# Patient Record
Sex: MALE | Race: WHITE | NOT HISPANIC OR LATINO | Employment: PART TIME | URBAN - METROPOLITAN AREA
[De-identification: names, ages, dates, MRNs, and addresses within clinical notes are randomized per-mention and may not be internally consistent; named-entity substitution may affect disease eponyms.]

---

## 2023-01-19 ENCOUNTER — OFFICE VISIT (OUTPATIENT)
Dept: URGENT CARE | Facility: MEDICAL CENTER | Age: 18
End: 2023-01-19

## 2023-01-19 VITALS
HEART RATE: 91 BPM | OXYGEN SATURATION: 97 % | DIASTOLIC BLOOD PRESSURE: 68 MMHG | TEMPERATURE: 97.8 F | RESPIRATION RATE: 18 BRPM | SYSTOLIC BLOOD PRESSURE: 100 MMHG

## 2023-01-19 DIAGNOSIS — J45.901 EXACERBATION OF ASTHMA, UNSPECIFIED ASTHMA SEVERITY, UNSPECIFIED WHETHER PERSISTENT: Primary | ICD-10-CM

## 2023-01-19 RX ORDER — BUDESONIDE AND FORMOTEROL FUMARATE DIHYDRATE 160; 4.5 UG/1; UG/1
AEROSOL RESPIRATORY (INHALATION)
COMMUNITY
Start: 2022-12-23

## 2023-01-19 RX ORDER — PREDNISONE 20 MG/1
TABLET ORAL
Qty: 18 TABLET | Refills: 0 | Status: SHIPPED | OUTPATIENT
Start: 2023-01-19

## 2023-01-19 RX ORDER — CETIRIZINE HYDROCHLORIDE 10 MG/1
10 TABLET ORAL
COMMUNITY

## 2023-01-19 NOTE — PROGRESS NOTES
[facility] [employer] [facility]        NAME: Jr Cruz is a 21 y o  male  : 2005    MRN: [id number]  DATE: 2023  TIME: 11:26 AM    Assessment and Plan   Exacerbation of asthma, unspecified asthma severity, unspecified whether persistent [J45 901]  1  Exacerbation of asthma, unspecified asthma severity, unspecified whether persistent  predniSONE 20 mg tablet            Patient Instructions       Follow up with PCP in 3-5 days  Proceed to  ER if symptoms worsen  Chief Complaint     Chief Complaint   Patient presents with   • Cough     Patient c/o cough, chest congestion with tightness x 2-3 days           History of Present Illness       13-year-old male here today with complaint of cough and some chest congestion and tightness over the last 2 or 3 days  Denies fever  He has a known history of seasonal allergy currently using Zyrtec daily and Flonase nasal spray-1 spray in each nostril once daily  He also uses Symbicort inhaler 1 puff once a day but in the last 2 to 3 days she is uses Symbicort inhaler twice a day with no significant improvement  Cough seems to be predominant worse at night  Nonproductive  Denies wheezing  Denies any cold symptoms presently  Review of Systems   Review of Systems   Constitutional: Negative  HENT: Positive for congestion and postnasal drip  Respiratory: Positive for cough and shortness of breath  Neurological: Negative            Current Medications       Current Outpatient Medications:   •  predniSONE 20 mg tablet, 3 tablets once daily day 1 through 3 then 2 tablets daily day 4 through 6 then 1 tablet daily day 7 through 9 , Disp: 18 tablet, Rfl: 0  •  cetirizine (ZyrTEC) 10 mg tablet, Take 10 mg by mouth, Disp: , Rfl:   •  Multiple Vitamin (MULTIVITAMIN ADULT PO), Take 1 Dose by mouth, Disp: , Rfl:   •  Symbicort 160-4 5 MCG/ACT inhaler, , Disp: , Rfl:     Current Allergies     Allergies as of 2023   • (No Known Allergies)            The following portions of the patient's history were reviewed and updated as appropriate: allergies, current medications, past family history, past medical history, past social history, past surgical history and problem list      No past medical history on file  No past surgical history on file  No family history on file  Medications have been verified  Objective   /68   Pulse 91   Temp 97 8 °F (36 6 °C)   Resp 18   SpO2 97%   No LMP for male patient  Physical Exam     Physical Exam  Vitals and nursing note reviewed  Constitutional:       Appearance: Normal appearance  HENT:      Nose:      Comments: Erythematous hypertrophic left turbinate     Mouth/Throat:      Comments: Cobblestoning observed in posterior pharynx  Pulmonary:      Effort: Pulmonary effort is normal       Comments: Decreased breath sounds bilaterally  Musculoskeletal:      Cervical back: Normal range of motion and neck supple  Neurological:      Mental Status: He is alert

## 2023-01-19 NOTE — PATIENT INSTRUCTIONS
Patient vital signs are stable  I started the patient on prednisone tapering dose from 60 to 20 mg over 9 days  I encouraged patient to continue with Symbicort 160/50-2 puffs twice a day for 7 days  He is to resume his albuterol inhaler 2 puffs 2-3 times a day for 7 days then taper as needed  He is to increase frequency of Flonase to 2 squirts in each nostril once daily  Recommended he obtain over-the-counter Mucinex for expectoration  Increase fluid intake  Follow-up with PCP if symptoms persist or worsen  Asthma   WHAT YOU NEED TO KNOW:   Asthma is a lung disease that makes breathing difficult  Chronic inflammation and reactions to triggers narrow the airways in the lungs  Asthma can become life-threatening if it is not managed  DISCHARGE INSTRUCTIONS:   Call your local emergency number (911 in the [address] [facility]) if:   You have severe shortness of breath  The skin around your neck and ribs pulls in with each breath  Your peak flow numbers are in the red zone of your AAP  Return to the emergency department if:   You have shortness of breath, even after you take your short-term medicine as directed  Your lips or nails turn blue or gray  Call your doctor or asthma specialist if:   You run out of medicine before your next refill is due  Your symptoms get worse  You need to take more medicine than usual to control your symptoms  You have questions or concerns about your condition or care  Medicines:   Medicines  may be used to decrease inflammation, open airways, and make it easier to breathe  Medicines may be inhaled, taken as a pill, or injected  Short-term medicines relieve your symptoms quickly  Long-term medicines are used to prevent future asthma attacks  Other medicines may be needed if your regular medicines are not able to prevent attacks  You may also need medicine to help control your allergies   Ask your healthcare provider for more information about any medicine you are given and how to take it safely  Take your medicine as directed  Contact your healthcare provider if you think your medicine is not helping or if you have side effects  Tell him of her if you are allergic to any medicine  Keep a list of the medicines, vitamins, and herbs you take  Include the amounts, and when and why you take them  Bring the list or the pill bottles to follow-up visits  Carry your medicine list with you in case of an emergency  Manage your symptoms and prevent future attacks: Follow your Asthma Action Plan (AAP)  This is a written plan that you and your healthcare provider create  It explains which medicine you need and when to change doses if necessary  It also explains how you can monitor symptoms and use a peak flow meter  The meter measures how well your lungs are working  Manage other health conditions , such as allergies, acid reflux, and sleep apnea  Identify and avoid triggers  These may include pets, dust mites, mold, and cockroaches  Do not smoke or be around others who smoke  Nicotine and other chemicals in cigarettes and cigars can cause lung damage  Ask your healthcare provider for information if you currently smoke and need help to quit  E-cigarettes or smokeless tobacco still contain nicotine  Talk to your healthcare provider before you use these products  Ask about the flu vaccine  The flu can make your asthma worse  You may need a yearly flu shot  Follow up with your healthcare provider as directed: You will need to return to make sure your medicine is working and your symptoms are controlled  You may be referred to an asthma or allergy specialist  Juan R Spencer may be asked to keep a record of your peak flow values and bring it with you to your appointments  Write down your questions so you remember to ask them during your visits    © Copyright [employer] 2022 Information is for End User's use only and may not be sold, redistributed or otherwise used for commercial purposes  All illustrations and images included in CareNotes® are the copyrighted property of A D A M , Inc  or Anna Patel  The above information is an  only  It is not intended as medical advice for individual conditions or treatments  Talk to your doctor, nurse or pharmacist before following any medical regimen to see if it is safe and effective for you

## 2023-04-17 PROBLEM — J45.20 MILD INTERMITTENT ASTHMA WITHOUT COMPLICATION: Status: ACTIVE | Noted: 2023-04-17

## 2023-04-17 PROBLEM — E55.9 VITAMIN D DEFICIENCY: Status: ACTIVE | Noted: 2023-04-17

## 2023-04-19 ENCOUNTER — CLINICAL SUPPORT (OUTPATIENT)
Dept: FAMILY MEDICINE CLINIC | Facility: CLINIC | Age: 18
End: 2023-04-19

## 2023-04-19 DIAGNOSIS — Z11.1 ENCOUNTER FOR TUBERCULIN SKIN TEST: Primary | ICD-10-CM

## 2023-07-02 ENCOUNTER — OFFICE VISIT (OUTPATIENT)
Dept: URGENT CARE | Facility: MEDICAL CENTER | Age: 18
End: 2023-07-02
Payer: COMMERCIAL

## 2023-07-02 VITALS
RESPIRATION RATE: 20 BRPM | DIASTOLIC BLOOD PRESSURE: 63 MMHG | HEART RATE: 64 BPM | WEIGHT: 210 LBS | HEIGHT: 75 IN | BODY MASS INDEX: 26.11 KG/M2 | OXYGEN SATURATION: 97 % | TEMPERATURE: 99.2 F | SYSTOLIC BLOOD PRESSURE: 132 MMHG

## 2023-07-02 DIAGNOSIS — K08.89 TOOTHACHE: Primary | ICD-10-CM

## 2023-07-02 PROCEDURE — 99213 OFFICE O/P EST LOW 20 MIN: CPT | Performed by: PHYSICIAN ASSISTANT

## 2023-07-02 RX ORDER — FLUTICASONE PROPIONATE 50 MCG
1 SPRAY, SUSPENSION (ML) NASAL DAILY
COMMUNITY

## 2023-07-02 RX ORDER — IBUPROFEN 600 MG/1
600 TABLET ORAL EVERY 6 HOURS PRN
Qty: 30 TABLET | Refills: 0 | Status: SHIPPED | OUTPATIENT
Start: 2023-07-02

## 2023-07-02 RX ORDER — AMOXICILLIN 500 MG/1
500 CAPSULE ORAL EVERY 8 HOURS SCHEDULED
Qty: 30 CAPSULE | Refills: 0 | Status: SHIPPED | OUTPATIENT
Start: 2023-07-02 | End: 2023-07-12

## 2023-07-02 NOTE — PROGRESS NOTES
[facility]        NAME: Jayda Lewis is a 19 y.o. male  : 2005    MRN: [id number]  DATE: 2023  TIME: 9:53 AM    /63   Pulse 64   Temp 99.2 °F (37.3 °C)   Resp 20   Ht 6' 3" (1.905 m)   Wt 95.3 kg (210 lb)   SpO2 97%   BMI 26.25 kg/m²     Assessment and Plan   Toothache [K08.89]  1. Toothache  ibuprofen (MOTRIN) 600 mg tablet    amoxicillin (AMOXIL) 500 mg capsule            Patient Instructions       Follow up with PCP in 3-5 days. Proceed to  ER if symptoms worsen. Chief Complaint     Chief Complaint   Patient presents with   • Dental Pain     Patient presents with pain in his mouth along his gum line on R lower gum line for 4 days         History of Present Illness       Pt with right lower tooth pain x 4 days       Review of Systems   Review of Systems   Constitutional: Negative. HENT: Positive for dental problem. Eyes: Negative. Respiratory: Negative. Cardiovascular: Negative. Gastrointestinal: Negative. Endocrine: Negative. Genitourinary: Negative. Musculoskeletal: Negative. Skin: Negative. Allergic/Immunologic: Negative. Neurological: Negative. Hematological: Negative. Psychiatric/Behavioral: Negative. All other systems reviewed and are negative.         Current Medications       Current Outpatient Medications:   •  amoxicillin (AMOXIL) 500 mg capsule, Take 1 capsule (500 mg total) by mouth every 8 (eight) hours for 10 days, Disp: 30 capsule, Rfl: 0  •  fluticasone (FLONASE) 50 mcg/act nasal spray, 1 spray into each nostril daily, Disp: , Rfl:   •  ibuprofen (MOTRIN) 600 mg tablet, Take 1 tablet (600 mg total) by mouth every 6 (six) hours as needed for mild pain, Disp: 30 tablet, Rfl: 0  •  Symbicort 160-4.5 MCG/ACT inhaler, , Disp: , Rfl:   •  cetirizine (ZyrTEC) 10 mg tablet, Take 10 mg by mouth (Patient not taking: Reported on 2023), Disp: , Rfl:   •  Multiple Vitamin (MULTIVITAMIN ADULT PO), Take 1 Dose by mouth (Patient not taking: Reported on 7/2/2023), Disp: , Rfl:   •  predniSONE 20 mg tablet, 3 tablets once daily day 1 through 3 then 2 tablets daily day 4 through 6 then 1 tablet daily day 7 through 9. (Patient not taking: Reported on 4/17/2023), Disp: 18 tablet, Rfl: 0    Current Allergies     Allergies as of 07/02/2023   • (No Known Allergies)            The following portions of the patient's history were reviewed and updated as appropriate: allergies, current medications, past family history, past medical history, past social history, past surgical history and problem list.     History reviewed. No pertinent past medical history. Past Surgical History:   Procedure Laterality Date   • SHOULDER SURGERY         History reviewed. No pertinent family history. Medications have been verified. Objective   /63   Pulse 64   Temp 99.2 °F (37.3 °C)   Resp 20   Ht 6' 3" (1.905 m)   Wt 95.3 kg (210 lb)   SpO2 97%   BMI 26.25 kg/m²        Physical Exam     Physical Exam  Vitals and nursing note reviewed. Constitutional:       Appearance: Normal appearance. He is normal weight. HENT:      Head: Normocephalic and atraumatic. Right Ear: Tympanic membrane, ear canal and external ear normal.      Left Ear: Tympanic membrane, ear canal and external ear normal.      Nose: Nose normal.      Mouth/Throat:      Mouth: Mucous membranes are moist.      Pharynx: Oropharynx is clear. Comments: Right lower wisdom tooth eruption   + gum pain  tmj pain , right tm wnl pharynx wnl  Eyes:      Extraocular Movements: Extraocular movements intact. Conjunctiva/sclera: Conjunctivae normal.      Pupils: Pupils are equal, round, and reactive to light. Cardiovascular:      Rate and Rhythm: Normal rate and regular rhythm. Pulses: Normal pulses. Heart sounds: Normal heart sounds. Pulmonary:      Effort: Pulmonary effort is normal.   Abdominal:      General: Abdomen is flat.  Bowel sounds are normal. Musculoskeletal:         General: Normal range of motion. Cervical back: Normal range of motion and neck supple. Skin:     General: Skin is warm. Capillary Refill: Capillary refill takes less than 2 seconds. Neurological:      General: No focal deficit present. Mental Status: He is alert and oriented to person, place, and time.    Psychiatric:         Mood and Affect: Mood normal.         Behavior: Behavior normal.

## 2024-07-13 ENCOUNTER — APPOINTMENT (EMERGENCY)
Dept: RADIOLOGY | Facility: HOSPITAL | Age: 19
End: 2024-07-13
Payer: COMMERCIAL

## 2024-07-13 ENCOUNTER — HOSPITAL ENCOUNTER (EMERGENCY)
Facility: HOSPITAL | Age: 19
Discharge: HOME | End: 2024-07-13
Attending: EMERGENCY MEDICINE
Payer: COMMERCIAL

## 2024-07-13 VITALS
HEIGHT: 75 IN | BODY MASS INDEX: 26.01 KG/M2 | RESPIRATION RATE: 18 BRPM | DIASTOLIC BLOOD PRESSURE: 79 MMHG | SYSTOLIC BLOOD PRESSURE: 130 MMHG | HEART RATE: 53 BPM | WEIGHT: 209.22 LBS | TEMPERATURE: 98 F | OXYGEN SATURATION: 100 %

## 2024-07-13 DIAGNOSIS — R07.9 CHEST PAIN OF UNCERTAIN ETIOLOGY: Primary | ICD-10-CM

## 2024-07-13 LAB
ALBUMIN SERPL BCG-MCNC: 4.7 G/DL (ref 3.5–5)
ALP SERPL-CCNC: 52 U/L (ref 34–104)
ALT SERPL W P-5'-P-CCNC: 28 U/L (ref 7–52)
ANION GAP SERPL CALCULATED.3IONS-SCNC: 6 MMOL/L (ref 4–13)
AST SERPL W P-5'-P-CCNC: 31 U/L (ref 13–39)
BILIRUB SERPL-MCNC: 0.69 MG/DL (ref 0.2–1)
BUN SERPL-MCNC: 18 MG/DL (ref 5–25)
CALCIUM SERPL-MCNC: 9.6 MG/DL (ref 8.4–10.2)
CHLORIDE SERPL-SCNC: 104 MMOL/L (ref 96–108)
CO2 SERPL-SCNC: 28 MMOL/L (ref 21–32)
CREAT SERPL-MCNC: 0.88 MG/DL (ref 0.6–1.3)
GFR SERPL CREATININE-BSD FRML MDRD: 122 ML/MIN/1.73SQ M
GLUCOSE SERPL-MCNC: 89 MG/DL (ref 65–140)
LIPASE SERPL-CCNC: 14 U/L (ref 11–82)
POTASSIUM SERPL-SCNC: 3.7 MMOL/L (ref 3.5–5.3)
PROT SERPL-MCNC: 7.2 G/DL (ref 6.4–8.4)
SODIUM SERPL-SCNC: 138 MMOL/L (ref 135–147)

## 2024-07-13 PROCEDURE — 93005 ELECTROCARDIOGRAM TRACING: CPT

## 2024-07-13 PROCEDURE — 80053 COMPREHEN METABOLIC PANEL: CPT | Performed by: EMERGENCY MEDICINE

## 2024-07-13 PROCEDURE — 36415 COLL VENOUS BLD VENIPUNCTURE: CPT | Performed by: EMERGENCY MEDICINE

## 2024-07-13 PROCEDURE — 99284 EMERGENCY DEPT VISIT MOD MDM: CPT | Performed by: EMERGENCY MEDICINE

## 2024-07-13 PROCEDURE — 99285 EMERGENCY DEPT VISIT HI MDM: CPT

## 2024-07-13 PROCEDURE — 71046 X-RAY EXAM CHEST 2 VIEWS: CPT

## 2024-07-13 PROCEDURE — 96375 TX/PRO/DX INJ NEW DRUG ADDON: CPT

## 2024-07-13 PROCEDURE — 96374 THER/PROPH/DIAG INJ IV PUSH: CPT

## 2024-07-13 PROCEDURE — 83690 ASSAY OF LIPASE: CPT | Performed by: EMERGENCY MEDICINE

## 2024-07-13 RX ORDER — FAMOTIDINE 20 MG/1
20 TABLET, FILM COATED ORAL 2 TIMES DAILY
Qty: 28 TABLET | Refills: 0 | Status: SHIPPED | OUTPATIENT
Start: 2024-07-13

## 2024-07-13 RX ORDER — FAMOTIDINE 10 MG/ML
20 INJECTION, SOLUTION INTRAVENOUS ONCE
Status: COMPLETED | OUTPATIENT
Start: 2024-07-13 | End: 2024-07-13

## 2024-07-13 RX ORDER — KETOROLAC TROMETHAMINE 30 MG/ML
15 INJECTION, SOLUTION INTRAMUSCULAR; INTRAVENOUS ONCE
Status: COMPLETED | OUTPATIENT
Start: 2024-07-13 | End: 2024-07-13

## 2024-07-13 RX ADMIN — KETOROLAC TROMETHAMINE 15 MG: 30 INJECTION, SOLUTION INTRAMUSCULAR; INTRAVENOUS at 10:42

## 2024-07-13 RX ADMIN — FAMOTIDINE 20 MG: 10 INJECTION, SOLUTION INTRAVENOUS at 10:47

## 2024-07-13 NOTE — DISCHARGE INSTRUCTIONS
Your ED findings for cardiac, pulmonary, or GI causes of chest discomfort are normal, and you have no risk factors for heart attack. At this time, you may be experiencing muscle spasm, reflux, onset of viral illness, or just undifferentiated chest pain.  Return as needed for increasing pain, shortness of breath, severe nausea, vomiting, or other new concern .

## 2024-07-13 NOTE — ED PROVIDER NOTES
History  Chief Complaint   Patient presents with    Chest Pain     Pt reports at work when pressure started in middle of chest. Denies SOB or radiation of pain. Took 81mg aspirin PTA.     18 yo M with asthma, c/o onset of pain he localizes to mid chest, about 1 hour ago, while working in the [employer], without shortness of breath, N/V, or diaphoresis.  He did some heavy lifting of boxes this morning, although nothing out of the ordinary.  He had a normal breakfast, banana and breakfast sandwich.  He does not recall similar pain.  He went home before coming to the ED, accompanied by his mother, and took aspirin PTA.        Chest Pain      Prior to Admission Medications   Prescriptions Last Dose Informant Patient Reported? Taking?   Multiple Vitamin (MULTIVITAMIN ADULT PO) Not Taking  Yes No   Sig: Take 1 Dose by mouth   Patient not taking: Reported on 2023   Symbicort 160-4.5 MCG/ACT inhaler   Yes Yes   cetirizine (ZyrTEC) 10 mg tablet Not Taking  Yes No   Sig: Take 10 mg by mouth   Patient not taking: Reported on 2023   fluticasone (FLONASE) 50 mcg/act nasal spray   Yes Yes   Si spray into each nostril daily   ibuprofen (MOTRIN) 600 mg tablet   No No   Sig: Take 1 tablet (600 mg total) by mouth every 6 (six) hours as needed for mild pain   predniSONE 20 mg tablet Not Taking  No No   Sig: 3 tablets once daily day 1 through 3 then 2 tablets daily day 4 through 6 then 1 tablet daily day 7 through 9.   Patient not taking: Reported on 2023      Facility-Administered Medications: None       Past Medical History:   Diagnosis Date    Asthma        Past Surgical History:   Procedure Laterality Date    SHOULDER SURGERY         History reviewed. No pertinent family history.  I have reviewed and agree with the history as documented.    E-Cigarette/Vaping    E-Cigarette Use Never User      E-Cigarette/Vaping Substances    Nicotine No     THC No     CBD No     Flavoring No     Other No     Unknown No       Social History     Tobacco Use    Smoking status: Never     Passive exposure: Past    Smokeless tobacco: Never   Vaping Use    Vaping status: Never Used   Substance Use Topics    Alcohol use: Not Currently       Review of Systems   Cardiovascular:  Positive for chest pain.       Physical Exam  Physical Exam  Vitals and nursing note reviewed.   Constitutional:       Appearance: He is well-developed.   HENT:      Head: Normocephalic and atraumatic.      Right Ear: External ear normal.      Left Ear: External ear normal.      Nose: Nose normal.      Mouth/Throat:      Mouth: Mucous membranes are moist.   Eyes:      Conjunctiva/sclera: Conjunctivae normal.      Pupils: Pupils are equal, round, and reactive to light.   Cardiovascular:      Rate and Rhythm: Normal rate.   Pulmonary:      Effort: Pulmonary effort is normal.   Abdominal:      Tenderness: There is no abdominal tenderness.   Musculoskeletal:         General: Normal range of motion.      Cervical back: Normal range of motion and neck supple.   Skin:     General: Skin is warm and dry.      Capillary Refill: Capillary refill takes less than 2 seconds.   Neurological:      Mental Status: He is alert and oriented to person, place, and time.      Cranial Nerves: No cranial nerve deficit.      Coordination: Coordination normal.   Psychiatric:         Behavior: Behavior normal.         Thought Content: Thought content normal.         Judgment: Judgment normal.         Vital Signs  ED Triage Vitals   Temperature Pulse Respirations Blood Pressure SpO2   07/13/24 1030 07/13/24 1013 07/13/24 1013 07/13/24 1013 07/13/24 1013   98 °F (36.7 °C) (!) 53 18 130/79 100 %      Temp Source Heart Rate Source Patient Position - Orthostatic VS BP Location FiO2 (%)   07/13/24 1030 07/13/24 1013 07/13/24 1013 07/13/24 1013 --   Oral Monitor Lying Right arm       Pain Score       07/13/24 1013       4           Vitals:    07/13/24 1013   BP: 130/79   Pulse: (!) 53   Patient  Position - Orthostatic VS: Lying         Visual Acuity      ED Medications  Medications   ketorolac (TORADOL) injection 15 mg (15 mg Intravenous Given 7/13/24 1042)   Famotidine (PF) (PEPCID) injection 20 mg (20 mg Intravenous Given 7/13/24 1047)       Diagnostic Studies  Results Reviewed       Procedure Component Value Units Date/Time    Lipase [[id number]]  (Normal) Collected: 07/13/24 1039    Lab Status: Final result Specimen: Blood from Arm, Left Updated: 07/13/24 1107     Lipase 14 u/L     Comprehensive metabolic panel [[id number]] Collected: 07/13/24 1039    Lab Status: Final result Specimen: Blood from Arm, Left Updated: 07/13/24 1107     Sodium 138 mmol/L      Potassium 3.7 mmol/L      Chloride 104 mmol/L      CO2 28 mmol/L      ANION GAP 6 mmol/L      BUN 18 mg/dL      Creatinine 0.88 mg/dL      Glucose 89 mg/dL      Calcium 9.6 mg/dL      AST 31 U/L      ALT 28 U/L      Alkaline Phosphatase 52 U/L      Total Protein 7.2 g/dL      Albumin 4.7 g/dL      Total Bilirubin 0.69 mg/dL      eGFR 122 ml/min/1.73sq m     Narrative:      National Kidney Disease Foundation guidelines for Chronic Kidney Disease (CKD):     Stage 1 with normal or high GFR (GFR > 90 mL/min/1.73 square meters)    Stage 2 Mild CKD (GFR = 60-89 mL/min/1.73 square meters)    Stage 3A Moderate CKD (GFR = 45-59 mL/min/1.73 square meters)    Stage 3B Moderate CKD (GFR = 30-44 mL/min/1.73 square meters)    Stage 4 Severe CKD (GFR = 15-29 mL/min/1.73 square meters)    Stage 5 End Stage CKD (GFR <15 mL/min/1.73 square meters)  Note: GFR calculation is accurate only with a steady state creatinine                   XR chest 2 views   Final Result by Stacey Naranjo MD (07/13 1110)      No acute cardiopulmonary disease.            Workstation performed: [id number]                    Procedures  ECG 12 Lead Documentation Only    Date/Time: 7/13/2024 10:46 AM    Performed by: Sotero Momin MD  Authorized by: Sotero Momin MD    Rate:      ECG rate:  51  Rhythm:     Rhythm: sinus rhythm    Ectopy:     Ectopy: none    QRS:     QRS axis:  Normal    QRS intervals:  Normal  Conduction:     Conduction: normal    ST segments:     ST segments:  Normal  T waves:     T waves: normal             ED Course  ED Course as of 07/13/24 1222   Sat Jul 13, 2024   1119 XR chest 2 views  My independent interpretation of imaging:   No acute findings      1126 Reviewed results with patient at bedside and updated on the plan.   I have no concern for MI by age and risk factor with normal EKG, but also no findings of pericarditis at this time.  He had some relief, began to have recurrence of discomfort.  I am recommending home care with rest, fluids, I will prescribe famotidine, and he can use motrin as needed .                                 SBIRT 20yo+      Flowsheet Row Most Recent Value   Initial Alcohol Screen: US AUDIT-C     1. How often do you have a drink containing alcohol? 0 Filed at: 07/13/2024 1032   2. How many drinks containing alcohol do you have on a typical day you are drinking?  0 Filed at: 07/13/2024 1032   3a. Male UNDER 65: How often do you have five or more drinks on one occasion? 0 Filed at: 07/13/2024 1032   Audit-C Score 0 Filed at: 07/13/2024 1032   GERMAN: How many times in the past year have you...    Used an illegal drug or used a prescription medication for non-medical reasons? Never Filed at: 07/13/2024 1032                      Medical Decision Making  Amount and/or Complexity of Data Reviewed  Labs: ordered.  Radiology: ordered. Decision-making details documented in ED Course.    Risk  Prescription drug management.                 Disposition  Final diagnoses:   Chest pain of uncertain etiology     Time reflects when diagnosis was documented in both MDM as applicable and the Disposition within this note       Time User Action Codes Description Comment    7/13/2024 11:29 AM Sotero Momin Add [R07.9] Chest pain of uncertain etiology            ED Disposition       ED Disposition   Discharge    Condition   Good    Date/Time   Sat Jul 13, 2024 1129    Comment   Stephan Thomas discharge to home/self care.                   Follow-up Information       Follow up With Specialties Details Why Contact Info    Tobias Arreguin MD Family Medicine Schedule an appointment as soon as possible for a visit  For followup, If symptoms worsen [address] [facility]  [address]  [facility] [address]  [phone]              Discharge Medication List as of 7/13/2024 11:32 AM        START taking these medications    Details   famotidine (PEPCID) 20 mg tablet Take 1 tablet (20 mg total) by mouth 2 (two) times a day, Starting Sat 7/13/2024, Normal           CONTINUE these medications which have NOT CHANGED    Details   fluticasone (FLONASE) 50 mcg/act nasal spray 1 spray into each nostril daily, Historical Med      Symbicort 160-4.5 MCG/ACT inhaler Starting Fri 12/23/2022, Historical Med      cetirizine (ZyrTEC) 10 mg tablet Take 10 mg by mouth, Historical Med      ibuprofen (MOTRIN) 600 mg tablet Take 1 tablet (600 mg total) by mouth every 6 (six) hours as needed for mild pain, Starting Sun 7/2/2023, Normal      Multiple Vitamin (MULTIVITAMIN ADULT PO) Take 1 Dose by mouth, Historical Med           STOP taking these medications       predniSONE 20 mg tablet Comments:   Reason for Stopping:               No discharge procedures on file.    PDMP Review       None            ED Provider  Electronically Signed by             Sotero Momin MD  07/13/24 8222

## 2024-07-14 LAB
ATRIAL RATE: 51 BPM
P AXIS: 56 DEGREES
PR INTERVAL: 168 MS
QRS AXIS: 80 DEGREES
QRSD INTERVAL: 100 MS
QT INTERVAL: 434 MS
QTC INTERVAL: 400 MS
T WAVE AXIS: 58 DEGREES
VENTRICULAR RATE: 51 BPM

## 2024-07-14 PROCEDURE — 93010 ELECTROCARDIOGRAM REPORT: CPT | Performed by: INTERNAL MEDICINE

## 2024-08-28 ENCOUNTER — OFFICE VISIT (OUTPATIENT)
Dept: FAMILY MEDICINE CLINIC | Facility: CLINIC | Age: 19
End: 2024-08-28
Payer: COMMERCIAL

## 2024-08-28 VITALS
WEIGHT: 207.4 LBS | TEMPERATURE: 97.9 F | HEART RATE: 77 BPM | BODY MASS INDEX: 26.62 KG/M2 | OXYGEN SATURATION: 99 % | SYSTOLIC BLOOD PRESSURE: 110 MMHG | DIASTOLIC BLOOD PRESSURE: 70 MMHG | HEIGHT: 74 IN | RESPIRATION RATE: 16 BRPM

## 2024-08-28 DIAGNOSIS — Z00.00 HEALTH CARE MAINTENANCE: Primary | ICD-10-CM

## 2024-08-28 DIAGNOSIS — E55.9 VITAMIN D DEFICIENCY: ICD-10-CM

## 2024-08-28 DIAGNOSIS — Z13.220 SCREENING FOR HYPERLIPIDEMIA: ICD-10-CM

## 2024-08-28 DIAGNOSIS — R21 RASH OF FINGER: ICD-10-CM

## 2024-08-28 DIAGNOSIS — E66.3 OVERWEIGHT (BMI 25.0-29.9): ICD-10-CM

## 2024-08-28 DIAGNOSIS — J45.20 MILD INTERMITTENT ASTHMA WITHOUT COMPLICATION: ICD-10-CM

## 2024-08-28 DIAGNOSIS — J30.2 SEASONAL ALLERGIES: ICD-10-CM

## 2024-08-28 PROCEDURE — 99395 PREV VISIT EST AGE 18-39: CPT | Performed by: FAMILY MEDICINE

## 2024-08-28 NOTE — PATIENT INSTRUCTIONS
Here for general PE and rec eating healthy and exercising to get BMI lower than 25. Rec sunscreen and monitoring for ticks. Update labs and also recheck vitamin D level. Consult dermatology for chronic right middle finger rash. Rec to keep finger clean with soap and water. Rec also to take inhaler as directed for asthma and also allergy meds as directed prn. Get at least 8 hours sleep per night.

## 2024-08-28 NOTE — PROGRESS NOTES
Ambulatory Visit  Name: Stephan Thomas      : 2005      MRN: [id number]  Encounter Provider: Lashawn Andujar DO  Encounter Date: 2024   Encounter department: [facility]  Chief Complaint   Patient presents with    Well Check     Patient Instructions   Here for general PE and rec eating healthy and exercising to get BMI lower than 25. Rec sunscreen and monitoring for ticks. Update labs and also recheck vitamin D level. Consult dermatology for chronic right middle finger rash. Rec to keep finger clean with soap and water. Rec also to take inhaler as directed for asthma and also allergy meds as directed prn. Get at least 8 hours sleep per night.     Assessment & Plan   1. Health care maintenance  -     Comprehensive metabolic panel; Future; Expected date: 2024  -     CBC and differential; Future; Expected date: 2024  -     Lipid Panel with Direct LDL reflex; Future; Expected date: 2024  -     Vitamin D 25 hydroxy; Future; Expected date: 2024  2. Mild intermittent asthma without complication  Comments:  stable on meds  Orders:  -     Comprehensive metabolic panel; Future; Expected date: 2024  3. Vitamin D deficiency  Comments:  takes vitamin D  Orders:  -     Comprehensive metabolic panel; Future; Expected date: 2024  -     Vitamin D 25 hydroxy; Future; Expected date: 2024  4. Seasonal allergies  Comments:  stable on med  5. Rash of finger  Comments:  consult dermatology for right middle finger rash  Orders:  -     Ambulatory Referral to Dermatology; Future  6. Overweight (BMI 25.0-29.9)  Comments:  lose weight via diet and exercise  Orders:  -     Comprehensive metabolic panel; Future; Expected date: 2024  -     Lipid Panel with Direct LDL reflex; Future; Expected date: 2024  7. 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 Patient does work at [employer] in [employer] in [place], [place]. Patient does exercise and tries to eat healthy. Patient sleeps 7 hours of sleep per night.         Review of Systems   Constitutional: Negative.    HENT: Negative.     Eyes: Negative.    Respiratory: Negative.     Cardiovascular: Negative.    Gastrointestinal: Negative.    Endocrine: Negative.    Genitourinary: Negative.    Musculoskeletal: Negative.    Skin: Negative.    Allergic/Immunologic: Negative.    Neurological: Negative.    Hematological: Negative.    Psychiatric/Behavioral: Negative.       Current Outpatient Medications on File Prior to Visit   Medication Sig Dispense Refill    cetirizine (ZyrTEC) 10 mg tablet Take 10 mg by mouth      fluticasone (FLONASE) 50 mcg/act nasal spray 1 spray into each nostril daily      ibuprofen (MOTRIN) 600 mg tablet Take 1 tablet (600 mg total) by mouth every 6 (six) hours as needed for mild pain 30 tablet 0    Multiple Vitamin (MULTIVITAMIN ADULT PO) Take 1 Dose by mouth      Symbicort 160-4.5 MCG/ACT inhaler       [DISCONTINUED] famotidine (PEPCID) 20 mg tablet Take 1 tablet (20 mg total) by mouth 2 (two) times a day (Patient not taking: Reported on 8/28/2024) 28 tablet 0     No current facility-administered medications on file prior to visit.      Objective     /70   Pulse 77   Temp 97.9 °F (36.6 °C) (Temporal)   Resp 16   Ht 6' 2.41\" (1.89 m)   Wt 94.1 kg (207 lb 6.4 oz)   SpO2 99%   BMI 26.34 kg/m²     Physical Exam  Constitutional:       Appearance: He is well-developed.      Comments: overweight   HENT:      Head: Normocephalic and atraumatic.      Right Ear: External ear normal.      Left Ear: External ear normal.      Nose: Nose normal.      Mouth/Throat:      Mouth: Mucous membranes are moist.   Eyes:      " Conjunctiva/sclera: Conjunctivae normal.      Pupils: Pupils are equal, round, and reactive to light.   Cardiovascular:      Rate and Rhythm: Normal rate and regular rhythm.      Pulses: Normal pulses.      Heart sounds: Normal heart sounds.   Pulmonary:      Effort: Pulmonary effort is normal.      Breath sounds: Normal breath sounds.   Abdominal:      General: Abdomen is flat. Bowel sounds are normal.      Palpations: Abdomen is soft.   Genitourinary:     Penis: Normal.       Testes: Normal.   Musculoskeletal:         General: Normal range of motion.      Cervical back: Normal range of motion and neck supple.   Skin:     General: Skin is warm and dry.      Capillary Refill: Capillary refill takes less than 2 seconds.   Neurological:      General: No focal deficit present.      Mental Status: He is alert and oriented to person, place, and time. Mental status is at baseline.      Deep Tendon Reflexes: Reflexes are normal and symmetric.   Psychiatric:         Mood and Affect: Mood normal.         Behavior: Behavior normal.         Thought Content: Thought content normal.         Judgment: Judgment normal.       Administrative Statements   I have spent a total time of 30 minutes in caring for this patient on the day of the visit/encounter including Diagnostic results, Prognosis, Risks and benefits of tx options, Instructions for management, Patient and family education, Importance of tx compliance, Risk factor reductions, Impressions, Counseling / Coordination of care, Documenting in the medical record, Reviewing / ordering tests, medicine, procedures  , and Obtaining or reviewing history  .